# Patient Record
Sex: FEMALE | Race: WHITE | NOT HISPANIC OR LATINO | Employment: OTHER | URBAN - METROPOLITAN AREA
[De-identification: names, ages, dates, MRNs, and addresses within clinical notes are randomized per-mention and may not be internally consistent; named-entity substitution may affect disease eponyms.]

---

## 2018-11-24 ENCOUNTER — OFFICE VISIT (OUTPATIENT)
Dept: URGENT CARE | Facility: CLINIC | Age: 68
End: 2018-11-24
Payer: MEDICARE

## 2018-11-24 VITALS
BODY MASS INDEX: 24.66 KG/M2 | DIASTOLIC BLOOD PRESSURE: 82 MMHG | TEMPERATURE: 98 F | HEART RATE: 71 BPM | HEIGHT: 65 IN | SYSTOLIC BLOOD PRESSURE: 173 MMHG | RESPIRATION RATE: 18 BRPM | OXYGEN SATURATION: 97 % | WEIGHT: 148 LBS

## 2018-11-24 DIAGNOSIS — Z23 NEED FOR PERTUSSIS VACCINATION: ICD-10-CM

## 2018-11-24 DIAGNOSIS — J01.00 ACUTE NON-RECURRENT MAXILLARY SINUSITIS: Primary | ICD-10-CM

## 2018-11-24 DIAGNOSIS — R03.0 ELEVATED BLOOD PRESSURE READING: ICD-10-CM

## 2018-11-24 PROCEDURE — 90471 IMMUNIZATION ADMIN: CPT | Mod: S$GLB,,, | Performed by: FAMILY MEDICINE

## 2018-11-24 PROCEDURE — 90715 TDAP VACCINE 7 YRS/> IM: CPT | Mod: AT,S$GLB,, | Performed by: FAMILY MEDICINE

## 2018-11-24 PROCEDURE — 99213 OFFICE O/P EST LOW 20 MIN: CPT | Mod: 25,S$GLB,, | Performed by: FAMILY MEDICINE

## 2018-11-24 RX ORDER — AMLODIPINE BESYLATE 10 MG/1
10 TABLET ORAL DAILY
Refills: 3 | COMMUNITY
Start: 2018-09-15 | End: 2023-07-25 | Stop reason: SDUPTHER

## 2018-11-24 RX ORDER — OMEPRAZOLE 40 MG/1
40 CAPSULE, DELAYED RELEASE ORAL DAILY
Refills: 11 | COMMUNITY
Start: 2018-10-03 | End: 2023-05-24

## 2018-11-24 RX ORDER — PRAVASTATIN SODIUM 20 MG/1
20 TABLET ORAL DAILY
Refills: 3 | COMMUNITY
Start: 2018-10-24 | End: 2023-07-25 | Stop reason: SDUPTHER

## 2018-11-24 RX ORDER — METOPROLOL SUCCINATE 25 MG/1
25 TABLET, EXTENDED RELEASE ORAL DAILY
Refills: 3 | COMMUNITY
Start: 2018-09-15 | End: 2023-07-13 | Stop reason: SDUPTHER

## 2018-11-24 RX ORDER — CLONIDINE HYDROCHLORIDE 0.1 MG/1
TABLET ORAL
Refills: 0 | COMMUNITY
Start: 2018-11-08 | End: 2023-07-25 | Stop reason: SDUPTHER

## 2018-11-24 RX ORDER — LISINOPRIL AND HYDROCHLOROTHIAZIDE 20; 25 MG/1; MG/1
TABLET ORAL
Refills: 1 | COMMUNITY
Start: 2018-09-19 | End: 2023-07-13 | Stop reason: SDUPTHER

## 2018-11-24 RX ORDER — SERTRALINE HYDROCHLORIDE 50 MG/1
50 TABLET, FILM COATED ORAL DAILY
Refills: 3 | COMMUNITY
Start: 2018-10-02 | End: 2023-07-13

## 2018-11-24 NOTE — PATIENT INSTRUCTIONS
Acute Sinusitis    Acute sinusitis is irritation and swelling of the sinuses. It is usually caused by a viral infection after a common cold. Your doctor can help you find relief.  What is acute sinusitis?  Sinuses are air-filled spaces in the skull behind the face. They are kept moist and clean by a lining of mucosa. Things such as pollen, smoke, and chemical fumes can irritate the mucosa. It can then swell up. As a response to irritation, the mucosa makes more mucus and other fluids. Tiny hairlike cilia cover the mucosa. Cilia help carry mucus toward the opening of the sinus. Too much mucus may cause the cilia to stop working. This blocks the sinus opening. A buildup of fluid in the sinuses then causes pain and pressure. It can also encourage bacteria to grow in the sinuses.  Common symptoms of acute sinusitis  You may have:  · Facial soreness pain  · Headache  · Fever  · Fluid draining in the back of the throat (postnasal drip)  · Congestion  · Drainage that is thick and colored, instead of clear  · Cough  Diagnosing acute sinusitis  Your doctor will ask about your symptoms and health history. He or she will look at your ear, nose, and throat. You usually won't need to have X-rays taken.    The doctor may take a sample of mucus to check for bacteria. If you have sinusitis that keeps coming back, you may need imaging tests such as X-rays or CAT scans. This will help your doctor check for a structural problem that may be causing the infection.  Treating acute sinusitis  Treatment is aimed at unblocking the sinus opening and helping the cilia work again. You may need to take antihistamine and decongestant medicine. These can reduce inflammation and decrease the amount of fluid your sinuses make. If you have a bacterial infection, you will need to take antibiotic medicine for 10 to 14 days. Take this medicine until it is gone, even if you feel better.  Date Last Reviewed: 10/1/2016  © 3821-4814 [employer] [address] [place] [address]. All rights reserved. This information is not intended as a substitute for professional medical care. Always follow your healthcare professional's instructions.

## 2018-11-24 NOTE — PROGRESS NOTES
"Subjective:       Patient ID: Laura Conner is a 68 y.o. female.    Vitals:    11/24/18 1547   BP: (!) 173/82   Pulse: 71   Resp: 18   Temp: 97.5 °F (36.4 °C)   SpO2: 97%   Weight: 67.1 kg (148 lb)   Height: 5' 5" (1.651 m)       Chief Complaint: Cough (started on monday ); Headache; and Sinus Problem  she is using flonase and claritan and OTC cloricedin.  She is worried because she is traveling in 2 days to go to [place] to visit her new born grandchild. She denies CP or SOB,   Sinus Problem   This is a new problem. The current episode started in the past 7 days. The problem has been gradually worsening since onset. There has been no fever. Her pain is at a severity of 3/10. The pain is mild. Associated symptoms include coughing, headaches and sinus pressure. Pertinent negatives include no chills, congestion, ear pain, hoarse voice, shortness of breath or sore throat. Treatments tried: sinus med otc  The treatment provided mild relief.     Review of Systems   Constitution: Negative for chills, fever and malaise/fatigue.   HENT: Positive for sinus pressure. Negative for congestion, ear pain, hoarse voice and sore throat.    Eyes: Negative for discharge and redness.   Cardiovascular: Negative for chest pain, dyspnea on exertion and leg swelling.   Respiratory: Positive for cough. Negative for shortness of breath, sputum production and wheezing.    Musculoskeletal: Negative for myalgias.   Gastrointestinal: Negative for abdominal pain and nausea.   Neurological: Positive for headaches.        Objective:      Physical Exam   Constitutional: She appears well-developed and well-nourished.   HENT:   Head: Normocephalic and atraumatic.   Right Ear: External ear normal.   Left Ear: External ear normal.   Mild erythema, tms clear, nares with clear mucous, bilateral maxillary sinus tenderness,    Eyes: Conjunctivae and EOM are normal. Pupils are equal, round, and reactive to light.   Conjunctiva mildy injected   Neck: " Normal range of motion. Neck supple.   Cardiovascular: Normal rate, regular rhythm and normal heart sounds.   Pulmonary/Chest: Effort normal and breath sounds normal.   Skin: Skin is warm and dry.   Psychiatric: She has a normal mood and affect. Her behavior is normal. Judgment and thought content normal.   Nursing note and vitals reviewed.      Assessment:       1. Acute non-recurrent maxillary sinusitis    2. Need for pertussis vaccination    3. Elevated blood pressure reading        Plan:       Laura was seen today for cough, headache and sinus problem.    Diagnoses and all orders for this visit:    Acute non-recurrent maxillary sinusitis    Need for pertussis vaccination  -     (In Office Administered) Tdap Vaccine    Elevated blood pressure reading    rest fluids, mucinex , flonase

## 2019-09-20 ENCOUNTER — HISTORICAL (OUTPATIENT)
Dept: URGENT CARE | Facility: CLINIC | Age: 69
End: 2019-09-20

## 2019-10-21 ENCOUNTER — HISTORICAL (OUTPATIENT)
Dept: ADMINISTRATIVE | Facility: HOSPITAL | Age: 69
End: 2019-10-21

## 2020-01-20 ENCOUNTER — HISTORICAL (OUTPATIENT)
Dept: ADMINISTRATIVE | Facility: HOSPITAL | Age: 70
End: 2020-01-20

## 2020-04-20 ENCOUNTER — HISTORICAL (OUTPATIENT)
Dept: URGENT CARE | Facility: CLINIC | Age: 70
End: 2020-04-20

## 2020-11-15 ENCOUNTER — OFFICE VISIT (OUTPATIENT)
Dept: URGENT CARE | Facility: CLINIC | Age: 70
End: 2020-11-15
Payer: MEDICARE

## 2020-11-15 VITALS
TEMPERATURE: 98 F | DIASTOLIC BLOOD PRESSURE: 78 MMHG | OXYGEN SATURATION: 97 % | WEIGHT: 145 LBS | HEART RATE: 67 BPM | SYSTOLIC BLOOD PRESSURE: 159 MMHG | HEIGHT: 65 IN | RESPIRATION RATE: 17 BRPM | BODY MASS INDEX: 24.16 KG/M2

## 2020-11-15 DIAGNOSIS — N30.01 ACUTE CYSTITIS WITH HEMATURIA: Primary | ICD-10-CM

## 2020-11-15 LAB
BILIRUB UR QL STRIP: NEGATIVE
GLUCOSE UR QL STRIP: NEGATIVE
KETONES UR QL STRIP: NEGATIVE
LEUKOCYTE ESTERASE UR QL STRIP: POSITIVE
PH, POC UA: 5.5 (ref 5–8)
POC BLOOD, URINE: POSITIVE
POC NITRATES, URINE: NEGATIVE
PROT UR QL STRIP: POSITIVE
SP GR UR STRIP: 1.01 (ref 1–1.03)
UROBILINOGEN UR STRIP-ACNC: NORMAL (ref 0.1–1.1)

## 2020-11-15 PROCEDURE — 81003 URINALYSIS AUTO W/O SCOPE: CPT | Mod: QW,S$GLB,, | Performed by: FAMILY MEDICINE

## 2020-11-15 PROCEDURE — 81003 POCT URINALYSIS, DIPSTICK, AUTOMATED, W/O SCOPE: ICD-10-PCS | Mod: QW,S$GLB,, | Performed by: FAMILY MEDICINE

## 2020-11-15 PROCEDURE — 99214 OFFICE O/P EST MOD 30 MIN: CPT | Mod: 25,S$GLB,, | Performed by: FAMILY MEDICINE

## 2020-11-15 PROCEDURE — 99214 PR OFFICE/OUTPT VISIT, EST, LEVL IV, 30-39 MIN: ICD-10-PCS | Mod: 25,S$GLB,, | Performed by: FAMILY MEDICINE

## 2020-11-15 RX ORDER — LORATADINE 10 MG/1
10 TABLET ORAL DAILY
COMMUNITY
Start: 2020-09-09

## 2020-11-15 RX ORDER — SULFAMETHOXAZOLE AND TRIMETHOPRIM 800; 160 MG/1; MG/1
1 TABLET ORAL 2 TIMES DAILY
Qty: 14 TABLET | Refills: 0 | Status: SHIPPED | OUTPATIENT
Start: 2020-11-15 | End: 2023-05-24

## 2020-11-15 RX ORDER — ESOMEPRAZOLE MAGNESIUM 40 MG/1
40 CAPSULE, DELAYED RELEASE ORAL DAILY
COMMUNITY
Start: 2020-09-03 | End: 2023-07-13 | Stop reason: SDUPTHER

## 2020-11-15 RX ORDER — OXYBUTYNIN CHLORIDE 5 MG/1
5 TABLET, EXTENDED RELEASE ORAL DAILY
COMMUNITY
Start: 2020-09-09 | End: 2023-07-25 | Stop reason: SDUPTHER

## 2020-11-15 NOTE — PATIENT INSTRUCTIONS
PLEASE READ YOUR DISCHARGE INSTRUCTIONS ENTIRELY AS IT CONTAINS IMPORTANT INFORMATION.      Take the antibiotics to completion.     Drink plenty of fluids, wipe front to back, take showers not baths, no scented soaps, wear breathable cotton underwear, urinate after sexual intercourse.     Please take your antibiotics to completion. Take a probiotic or eat yogurt daily with the medication. Take with a meal. Stop the medication and call the clinic or your primary care provider if you develop severe watery diarrhea, severe abdominal pain, blood in your stool.       Please go to the ER for worsening symptoms including fever, worsening flank pain, vomiting, etc.       Please return or see your primary care doctor if you develop new or worsening symptoms.     Please arrange follow up with your primary medical clinic as soon as possible. You must understand that you've received an Urgent Care treatment only and that you may be released before all of your medical problems are known or treated. You, the patient, will arrange for follow up as instructed. If your symptoms worsen or fail to improve you should go to the Emergency Room.  WE CANNOT RULE OUT ALL POSSIBLE CAUSES OF YOUR SYMPTOMS IN THE URGENT CARE SETTING PLEASE GO TO THE ER IF YOU FEELS YOUR CONDITION IS WORSENING OR YOU WOULD LIKE EMERGENT EVALUATION.      Understanding Urinary Tract Infections (UTIs)  Most UTIs are caused by bacteria, although they may also be caused by viruses or fungi. Bacteria from the bowel are the most common source of infection. The infection may start because of any of the following:  · Sexual activity. During sex, bacteria can travel from the penis, vagina, or rectum into the urethra.   · Bacteria on the skin outside the rectum may travel into the urethra. This is more common in women since the rectum and urethra are closer to each other than in men. Wiping from front to back after using the toilet and keeping the area clean can help  prevent germs from getting to the urethra.  · Blockage of urine flow through the urinary tract. If urine sits too long, germs may start to grow out of control.      Parts of the urinary tract  The infection can occur in any part of the urinary tract.  · The kidneys collect and store urine.  · The ureters carry urine from the kidneys to the bladder.  · The bladder holds urine until you are ready to let it out.  · The urethra carries urine from the bladder out of the body. It is shorter in women, so bacteria can move through it more easily. The urethra is longer in men, so a UTI is less likely to reach the bladder or kidneys in men.  Date Last Reviewed: 1/1/2017  © 6651-6539 [employer]. [address] [place] [address], [place] [address]. All rights reserved. This information is not intended as a substitute for professional medical care. Always follow your healthcare professional's instructions.

## 2020-11-15 NOTE — PROGRESS NOTES
"Subjective:       Patient ID: Laura Conner is a 70 y.o. female.    Vitals:  height is 5' 5" (1.651 m) and weight is 65.8 kg (145 lb). Her tympanic temperature is 98.1 °F (36.7 °C). Her blood pressure is 159/78 (abnormal) and her pulse is 67. Her respiration is 17 and oxygen saturation is 97%.     Chief Complaint: Dysuria    Pt states urinary urgency, frequency, and burning x 6 days. Pt states hematuria today. No fever, flank pain, vaginal discharge or itching. Hx of utis feels similar.    Dysuria   This is a new problem. The current episode started in the past 7 days. The problem has been gradually worsening. The pain is at a severity of 5/10. There has been no fever. Associated symptoms include frequency, hematuria and urgency. Pertinent negatives include no chills, nausea, vomiting or rash. She has tried nothing for the symptoms. Her past medical history is significant for recurrent UTIs.       Constitution: Negative for chills, fatigue and fever.   HENT: Negative for congestion and sore throat.    Neck: Negative for painful lymph nodes.   Cardiovascular: Negative for chest pain and leg swelling.   Eyes: Negative for double vision and blurred vision.   Respiratory: Negative for cough and shortness of breath.    Gastrointestinal: Negative for nausea, vomiting and diarrhea.   Genitourinary: Positive for dysuria, frequency, urgency and hematuria. Negative for history of kidney stones.   Musculoskeletal: Negative for joint pain, joint swelling, muscle cramps and muscle ache.   Skin: Negative for color change, pale, rash and bruising.   Allergic/Immunologic: Negative for seasonal allergies.   Neurological: Negative for dizziness, history of vertigo, light-headedness, passing out and headaches.   Hematologic/Lymphatic: Negative for swollen lymph nodes.   Psychiatric/Behavioral: Negative for nervous/anxious, sleep disturbance and depression. The patient is not nervous/anxious.        Objective:      Physical Exam "   Constitutional: She is oriented to person, place, and time. She appears well-developed.   HENT:   Head: Normocephalic and atraumatic.   Ears:   Right Ear: External ear normal.   Left Ear: External ear normal.   Nose: Nose normal. No nasal deformity. No epistaxis.   Mouth/Throat: Oropharynx is clear and moist and mucous membranes are normal.   Eyes: Lids are normal.   Neck: Trachea normal, normal range of motion and phonation normal. Neck supple.   Cardiovascular: Normal pulses.   Pulmonary/Chest: Effort normal.   Abdominal: Soft. Normal appearance and bowel sounds are normal. She exhibits no distension. There is no abdominal tenderness. There is no rebound, no guarding, no tenderness at McBurney's point, negative Pugh's sign, no left CVA tenderness and no right CVA tenderness.   Neurological: She is alert and oriented to person, place, and time.   Skin: Skin is warm, dry and intact. Psychiatric: Her speech is normal and behavior is normal.   Nursing note and vitals reviewed.        Results for orders placed or performed in visit on 11/15/20   POCT Urinalysis, Dipstick, Automated, W/O Scope   Result Value Ref Range    POC Blood, Urine Positive (A) Negative    POC Bilirubin, Urine Negative Negative    POC Urobilinogen, Urine Normal 0.1 - 1.1    POC Ketones, Urine Negative Negative    POC Protein, Urine Positive (A) Negative    POC Nitrates, Urine Negative Negative    POC Glucose, Urine Negative Negative    pH, UA 5.5 5 - 8    POC Specific Gravity, Urine 1.015 1.003 - 1.029    POC Leukocytes, Urine Positive (A) Negative       Assessment:       1. Acute cystitis with hematuria        Plan:         Acute cystitis with hematuria  -     POCT Urinalysis, Dipstick, Automated, W/O Scope  -     sulfamethoxazole-trimethoprim 800-160mg (BACTRIM DS) 800-160 mg Tab; Take 1 tablet by mouth 2 (two) times daily.  Dispense: 14 tablet; Refill: 0       declined pyridium  Patient Instructions     PLEASE READ YOUR DISCHARGE  INSTRUCTIONS ENTIRELY AS IT CONTAINS IMPORTANT INFORMATION.      Take the antibiotics to completion.     Drink plenty of fluids, wipe front to back, take showers not baths, no scented soaps, wear breathable cotton underwear, urinate after sexual intercourse.     Please take your antibiotics to completion. Take a probiotic or eat yogurt daily with the medication. Take with a meal. Stop the medication and call the clinic or your primary care provider if you develop severe watery diarrhea, severe abdominal pain, blood in your stool.       Please go to the ER for worsening symptoms including fever, worsening flank pain, vomiting, etc.       Please return or see your primary care doctor if you develop new or worsening symptoms.     Please arrange follow up with your primary medical clinic as soon as possible. You must understand that you've received an Urgent Care treatment only and that you may be released before all of your medical problems are known or treated. You, the patient, will arrange for follow up as instructed. If your symptoms worsen or fail to improve you should go to the Emergency Room.  WE CANNOT RULE OUT ALL POSSIBLE CAUSES OF YOUR SYMPTOMS IN THE URGENT CARE SETTING PLEASE GO TO THE ER IF YOU FEELS YOUR CONDITION IS WORSENING OR YOU WOULD LIKE EMERGENT EVALUATION.      Understanding Urinary Tract Infections (UTIs)  Most UTIs are caused by bacteria, although they may also be caused by viruses or fungi. Bacteria from the bowel are the most common source of infection. The infection may start because of any of the following:  · Sexual activity. During sex, bacteria can travel from the penis, vagina, or rectum into the urethra.   · Bacteria on the skin outside the rectum may travel into the urethra. This is more common in women since the rectum and urethra are closer to each other than in men. Wiping from front to back after using the toilet and keeping the area clean can help prevent germs from getting to  the urethra.  · Blockage of urine flow through the urinary tract. If urine sits too long, germs may start to grow out of control.      Parts of the urinary tract  The infection can occur in any part of the urinary tract.  · The kidneys collect and store urine.  · The ureters carry urine from the kidneys to the bladder.  · The bladder holds urine until you are ready to let it out.  · The urethra carries urine from the bladder out of the body. It is shorter in women, so bacteria can move through it more easily. The urethra is longer in men, so a UTI is less likely to reach the bladder or kidneys in men.  Date Last Reviewed: 1/1/2017  © 6486-2043 [employer]. [address] [place] [address], [place] [address]. All rights reserved. This information is not intended as a substitute for professional medical care. Always follow your healthcare professional's instructions.

## 2021-02-01 ENCOUNTER — HISTORICAL (OUTPATIENT)
Dept: URGENT CARE | Facility: CLINIC | Age: 71
End: 2021-02-01

## 2022-04-07 ENCOUNTER — HISTORICAL (OUTPATIENT)
Dept: ADMINISTRATIVE | Facility: HOSPITAL | Age: 72
End: 2022-04-07
Payer: MEDICARE

## 2022-04-23 VITALS
WEIGHT: 145 LBS | OXYGEN SATURATION: 96 % | SYSTOLIC BLOOD PRESSURE: 155 MMHG | DIASTOLIC BLOOD PRESSURE: 76 MMHG | HEIGHT: 65 IN | BODY MASS INDEX: 24.16 KG/M2

## 2022-04-28 NOTE — OP NOTE
Patient:   Laura Conner             MRN: [id number]            FIN: [id number]               Age:   69 years     Sex:  Female     :  1950   Associated Diagnoses:   None   Author:   Maynor MURCIA, Tammy SMITH      NAME: Laura Conner  SURGEON:  Tammy Mcguire MD    YOB: 1950  DATE OF SURGERY: 2020    PREOPERATIVE DIAGNOSIS:  Cataract, left eye.    POSTOPERATIVE DIAGNOSIS:  Cataract, left eye.    PROCEDURE:  Cataract extraction with intraocular lens placement, left eye.    HISTORY:  The patient is a 69 year-old female, who presented to the clinic with a 2+ nuclear sclerotic cataract causing difficulty in patient's activities of daily living. After thorough discussion of risks, benefits, alternatives and complications, the patient expressed understanding and wished to proceed with cataract extraction.  Goal of distance verified at bedside.  Pt has decided to proceed with femto assisted surgery, and distance monofocal IOL with LRI.    PROCEDURE IN DETAIL:  On the day of surgery patient was brought to the preoperative holding area, where patient was given five drops each of phenylephrine, tropicamide and Cyclopentolate into the operative eye.   Patient was then brought to the [facility], where LRI, Capsulotomy  and lens fragmentation were completed after toric markings were made.  Patient was then brought to the operating room where patient was given Marcaine drops into the operative eye.  Patient was prepped and draped in normal sterile fashion.  A lid speculum was inserted.  Using operating microscope, 1.0-mm keratome was used to create a side port wound through which 1% epishugarcaine was injected, followed by Viscoat.  A 2.65 mm keratome was used to create triplanar phacoemulsification wound, through which continuous tear capsulorrhexis was performed using cystotome and Utrata forceps. Hydrodissection and delineation were performed until lens was rotating freely in capsular bag.   Phacoemulsification was carried out in divide and conquer fashion to remove nuclear and epinuclear fragments.  I/A was used to remove cortex carefully.  Healon was injected into the capsular bag, which was found to be free of tears or defect.  A ZCBOO +19.0 diopter lens was inserted and carefully rotated into place a.  Viscoelastic was removed from the eye.  Wounds were sealed using hydration.  Lens was in excellent position at end of case.  Vigamox  and Maxitrol ointment was placed into the patient's eye, which was patched and shielded.  The patient tolerated the procedure well and will followup tomorrow in clinic.

## 2022-04-28 NOTE — OP NOTE
Patient:   Laura Conner             MRN: [id number]            FIN: [id number]               Age:   69 years     Sex:  Female     :  1950   Associated Diagnoses:   None   Author:   Maynor MURCIA, Tammy SMITH      NAME: Laura Conner  SURGEON:  Tammy Mcguire MD    YOB: 1950  DATE OF SURGERY:10/21/2019    PREOPERATIVE DIAGNOSIS:  Cataract, right eye.    POSTOPERATIVE DIAGNOSIS:  Cataract, right eye.    PROCEDURE:  Cataract extraction with intraocular lens placement, right eye.    HISTORY:  The patient is a 69 year-old female, who presented to the clinic with a 2+ nuclear sclerotic cataract causing difficulty in patient's activities of daily living. After thorough discussion of risks, benefits, alternatives and complications, the patient expressed understanding and wished to proceed with cataract extraction, monofocal IOL, and Catalys laser.  Goal of distance verified at bedside.      PROCEDURE IN DETAIL:  On the day of surgery patient was brought to the preoperative holding area, where patient was given five drops each of phenylephrine, tropicamide and Cyclopentolate into the operative eye.   Patient was then brought to the laser suite, where laser was used to perform capsulotomy and fragment the lens.  Patient was then brought to the operating room where patient was given Marcaine drops into the operative eye.  Patient was prepped and draped in normal sterile fashion.  A lid speculum was inserted.  Using operating microscope, 1.0-mm keratome was used to create a side port wound through which 1% epishugarcaine was injected, followed by Viscoat.  A 2.65 mm keratome was used to create triplanar phacoemulsification wound, through which capsulotomy was carefully removed using Utrata forceps. Hydrodissection and delineation were performed until lens was rotating freely in capsular bag.  Phacoemulsification was carried out in divide and conquer fashion to remove nuclear and epinuclear fragments.   I/A was used to remove cortex carefully.  Healon was injected into the capsular bag, which was found to be free of tears or defect.  A ZCBOO +20.5 diopter lens was inserted and carefully rotated into place.  Viscoelastic was removed from the eye.  Wounds were sealed using hydration.  Lens was in excellent position at end of case.  Vigamox  and Maxitrol ointment was placed into the patient's eye, which was patched and shielded.  The patient tolerated the procedure well and will followup tomorrow in clinic.

## 2023-05-24 ENCOUNTER — OFFICE VISIT (OUTPATIENT)
Dept: INTERNAL MEDICINE | Facility: CLINIC | Age: 73
End: 2023-05-24
Payer: MEDICARE

## 2023-05-24 ENCOUNTER — CLINICAL SUPPORT (OUTPATIENT)
Dept: INTERNAL MEDICINE | Facility: CLINIC | Age: 73
End: 2023-05-24
Payer: MEDICARE

## 2023-05-24 ENCOUNTER — HOSPITAL ENCOUNTER (OUTPATIENT)
Dept: RADIOLOGY | Facility: HOSPITAL | Age: 73
Discharge: HOME | End: 2023-05-24
Attending: INTERNAL MEDICINE
Payer: MEDICARE

## 2023-05-24 VITALS
SYSTOLIC BLOOD PRESSURE: 136 MMHG | DIASTOLIC BLOOD PRESSURE: 70 MMHG | TEMPERATURE: 98 F | BODY MASS INDEX: 24.87 KG/M2 | OXYGEN SATURATION: 98 % | HEART RATE: 67 BPM | WEIGHT: 149.25 LBS

## 2023-05-24 VITALS — HEIGHT: 65 IN | BODY MASS INDEX: 24.66 KG/M2 | WEIGHT: 148 LBS

## 2023-05-24 DIAGNOSIS — N95.1 MENOPAUSAL STATE: ICD-10-CM

## 2023-05-24 DIAGNOSIS — Z78.0 ASYMPTOMATIC MENOPAUSE: ICD-10-CM

## 2023-05-24 DIAGNOSIS — E78.5 HYPERLIPIDEMIA LDL GOAL <100: ICD-10-CM

## 2023-05-24 DIAGNOSIS — Z12.31 ENCOUNTER FOR SCREENING MAMMOGRAM FOR MALIGNANT NEOPLASM OF BREAST: ICD-10-CM

## 2023-05-24 DIAGNOSIS — R53.83 FATIGUE, UNSPECIFIED TYPE: ICD-10-CM

## 2023-05-24 DIAGNOSIS — Z87.440 HISTORY OF RECURRENT UTIS: ICD-10-CM

## 2023-05-24 DIAGNOSIS — I10 PRIMARY HYPERTENSION: Primary | Chronic | ICD-10-CM

## 2023-05-24 DIAGNOSIS — R63.5 WEIGHT GAIN: ICD-10-CM

## 2023-05-24 DIAGNOSIS — Z00.00 ANNUAL PHYSICAL EXAM: Primary | ICD-10-CM

## 2023-05-24 DIAGNOSIS — K21.9 GASTROESOPHAGEAL REFLUX DISEASE, UNSPECIFIED WHETHER ESOPHAGITIS PRESENT: Chronic | ICD-10-CM

## 2023-05-24 DIAGNOSIS — R79.9 ABNORMAL FINDING OF BLOOD CHEMISTRY, UNSPECIFIED: ICD-10-CM

## 2023-05-24 DIAGNOSIS — N39.41 URGE INCONTINENCE: ICD-10-CM

## 2023-05-24 LAB
ALBUMIN SERPL BCP-MCNC: 4.5 G/DL (ref 3.5–5.2)
ALP SERPL-CCNC: 61 U/L (ref 55–135)
ALT SERPL W/O P-5'-P-CCNC: 17 U/L (ref 10–44)
ANION GAP SERPL CALC-SCNC: 9 MMOL/L (ref 8–16)
AST SERPL-CCNC: 22 U/L (ref 10–40)
BASOPHILS # BLD AUTO: 0.02 K/UL (ref 0–0.2)
BASOPHILS NFR BLD: 0.4 % (ref 0–1.9)
BILIRUB SERPL-MCNC: 0.6 MG/DL (ref 0.1–1)
BUN SERPL-MCNC: 16 MG/DL (ref 8–23)
CALCIUM SERPL-MCNC: 10.3 MG/DL (ref 8.7–10.5)
CHLORIDE SERPL-SCNC: 105 MMOL/L (ref 95–110)
CHOLEST SERPL-MCNC: 222 MG/DL (ref 120–199)
CHOLEST/HDLC SERPL: 2.7 {RATIO} (ref 2–5)
CO2 SERPL-SCNC: 27 MMOL/L (ref 23–29)
CREAT SERPL-MCNC: 0.9 MG/DL (ref 0.5–1.4)
DIFFERENTIAL METHOD: ABNORMAL
EOSINOPHIL # BLD AUTO: 0.1 K/UL (ref 0–0.5)
EOSINOPHIL NFR BLD: 1.4 % (ref 0–8)
ERYTHROCYTE [DISTWIDTH] IN BLOOD BY AUTOMATED COUNT: 13.4 % (ref 11.5–14.5)
EST. GFR  (NO RACE VARIABLE): >60 ML/MIN/1.73 M^2
ESTIMATED AVG GLUCOSE: 94 MG/DL (ref 68–131)
ESTRADIOL SERPL-MCNC: 13 PG/ML
FSH SERPL-ACNC: 68.28 MIU/ML
GLUCOSE SERPL-MCNC: 88 MG/DL (ref 70–110)
HBA1C MFR BLD: 4.9 % (ref 4–5.6)
HCT VFR BLD AUTO: 39.5 % (ref 37–48.5)
HDLC SERPL-MCNC: 82 MG/DL (ref 40–75)
HDLC SERPL: 36.9 % (ref 20–50)
HGB BLD-MCNC: 13.2 G/DL (ref 12–16)
HYALINE CASTS UR QL AUTO: 3 /LPF
IMM GRANULOCYTES # BLD AUTO: 0.05 K/UL (ref 0–0.04)
IMM GRANULOCYTES NFR BLD AUTO: 0.9 % (ref 0–0.5)
LDLC SERPL CALC-MCNC: 126.4 MG/DL (ref 63–159)
LYMPHOCYTES # BLD AUTO: 1.6 K/UL (ref 1–4.8)
LYMPHOCYTES NFR BLD: 28.6 % (ref 18–48)
MCH RBC QN AUTO: 31.1 PG (ref 27–31)
MCHC RBC AUTO-ENTMCNC: 33.4 G/DL (ref 32–36)
MCV RBC AUTO: 93 FL (ref 82–98)
MICROSCOPIC COMMENT: ABNORMAL
MONOCYTES # BLD AUTO: 0.5 K/UL (ref 0.3–1)
MONOCYTES NFR BLD: 9.5 % (ref 4–15)
NEUTROPHILS # BLD AUTO: 3.3 K/UL (ref 1.8–7.7)
NEUTROPHILS NFR BLD: 59.2 % (ref 38–73)
NONHDLC SERPL-MCNC: 140 MG/DL
NRBC BLD-RTO: 0 /100 WBC
PLATELET # BLD AUTO: 238 K/UL (ref 150–450)
PMV BLD AUTO: 9.8 FL (ref 9.2–12.9)
POTASSIUM SERPL-SCNC: 4 MMOL/L (ref 3.5–5.1)
PROT SERPL-MCNC: 7.5 G/DL (ref 6–8.4)
RBC # BLD AUTO: 4.24 M/UL (ref 4–5.4)
RBC #/AREA URNS AUTO: 1 /HPF (ref 0–4)
SODIUM SERPL-SCNC: 141 MMOL/L (ref 136–145)
T4 FREE SERPL-MCNC: 0.83 NG/DL (ref 0.71–1.51)
THYROPEROXIDASE IGG SERPL-ACNC: <6 IU/ML
TRIGL SERPL-MCNC: 68 MG/DL (ref 30–150)
TSH SERPL DL<=0.005 MIU/L-ACNC: 2.16 UIU/ML (ref 0.4–4)
WBC # BLD AUTO: 5.55 K/UL (ref 3.9–12.7)
WBC #/AREA URNS AUTO: 3 /HPF (ref 0–5)

## 2023-05-24 PROCEDURE — 84403 ASSAY OF TOTAL TESTOSTERONE: CPT | Performed by: INTERNAL MEDICINE

## 2023-05-24 PROCEDURE — 99999 PR PBB SHADOW E&M-EST. PATIENT-LVL IV: CPT | Mod: PBBFAC,,, | Performed by: INTERNAL MEDICINE

## 2023-05-24 PROCEDURE — 77063 BREAST TOMOSYNTHESIS BI: CPT | Mod: 26,,, | Performed by: RADIOLOGY

## 2023-05-24 PROCEDURE — 99214 OFFICE O/P EST MOD 30 MIN: CPT | Mod: PBBFAC | Performed by: INTERNAL MEDICINE

## 2023-05-24 PROCEDURE — 77067 SCR MAMMO BI INCL CAD: CPT | Mod: TC

## 2023-05-24 PROCEDURE — 81001 URINALYSIS AUTO W/SCOPE: CPT | Performed by: INTERNAL MEDICINE

## 2023-05-24 PROCEDURE — 84270 ASSAY OF SEX HORMONE GLOBUL: CPT | Performed by: INTERNAL MEDICINE

## 2023-05-24 PROCEDURE — 83036 HEMOGLOBIN GLYCOSYLATED A1C: CPT | Performed by: INTERNAL MEDICINE

## 2023-05-24 PROCEDURE — 84443 ASSAY THYROID STIM HORMONE: CPT | Performed by: INTERNAL MEDICINE

## 2023-05-24 PROCEDURE — 77067 MAMMO DIGITAL SCREENING BILAT WITH TOMO: ICD-10-PCS | Mod: 26,,, | Performed by: RADIOLOGY

## 2023-05-24 PROCEDURE — 85025 COMPLETE CBC W/AUTO DIFF WBC: CPT | Performed by: INTERNAL MEDICINE

## 2023-05-24 PROCEDURE — 83001 ASSAY OF GONADOTROPIN (FSH): CPT | Performed by: INTERNAL MEDICINE

## 2023-05-24 PROCEDURE — 77067 SCR MAMMO BI INCL CAD: CPT | Mod: 26,,, | Performed by: RADIOLOGY

## 2023-05-24 PROCEDURE — 99204 OFFICE O/P NEW MOD 45 MIN: CPT | Mod: S$PBB,,, | Performed by: INTERNAL MEDICINE

## 2023-05-24 PROCEDURE — 82670 ASSAY OF TOTAL ESTRADIOL: CPT | Performed by: INTERNAL MEDICINE

## 2023-05-24 PROCEDURE — 80053 COMPREHEN METABOLIC PANEL: CPT | Performed by: INTERNAL MEDICINE

## 2023-05-24 PROCEDURE — 80061 LIPID PANEL: CPT | Performed by: INTERNAL MEDICINE

## 2023-05-24 PROCEDURE — 77063 MAMMO DIGITAL SCREENING BILAT WITH TOMO: ICD-10-PCS | Mod: 26,,, | Performed by: RADIOLOGY

## 2023-05-24 PROCEDURE — 84439 ASSAY OF FREE THYROXINE: CPT | Performed by: INTERNAL MEDICINE

## 2023-05-24 PROCEDURE — 36415 COLL VENOUS BLD VENIPUNCTURE: CPT | Performed by: INTERNAL MEDICINE

## 2023-05-24 PROCEDURE — 99999 PR PBB SHADOW E&M-EST. PATIENT-LVL IV: ICD-10-PCS | Mod: PBBFAC,,, | Performed by: INTERNAL MEDICINE

## 2023-05-24 PROCEDURE — 99204 PR OFFICE/OUTPT VISIT, NEW, LEVL IV, 45-59 MIN: ICD-10-PCS | Mod: S$PBB,,, | Performed by: INTERNAL MEDICINE

## 2023-05-24 RX ORDER — DOCUSATE CALCIUM 240 MG
240 CAPSULE ORAL 2 TIMES DAILY
COMMUNITY

## 2023-05-24 RX ORDER — NALTREXONE HYDROCHLORIDE AND BUPROPION HYDROCHLORIDE 8; 90 MG/1; MG/1
TABLET, EXTENDED RELEASE ORAL
COMMUNITY
Start: 2023-04-26

## 2023-05-24 RX ORDER — ASCORBIC ACID 500 MG
500 TABLET ORAL DAILY
COMMUNITY

## 2023-05-24 RX ORDER — ASPIRIN 325 MG
100 TABLET, DELAYED RELEASE (ENTERIC COATED) ORAL DAILY
COMMUNITY

## 2023-05-28 ENCOUNTER — PATIENT MESSAGE (OUTPATIENT)
Dept: ADMINISTRATIVE | Facility: HOSPITAL | Age: 73
End: 2023-05-28
Payer: MEDICARE

## 2023-06-03 LAB
ALBUMIN SERPL-MCNC: 4.7 G/DL (ref 3.6–5.1)
SHBG SERPL-SCNC: 52 NMOL/L (ref 14–73)
TESTOST FREE SERPL-MCNC: 3.5 PG/ML (ref 0.3–5)
TESTOST SERPL-MCNC: 43 NG/DL (ref 2–45)
TESTOSTERONE.FREE+WB SERPL-MCNC: 7.4 NG/DL (ref 0.5–8.8)

## 2023-06-04 RX ORDER — BROMPHENIRAMINE MALEATE, DEXTROMETHORPHAN HBR, PHENYLEPHRINE HCL, DIPHENHYDRAMINE HCL, PHENYLEPHRINE HCL 0.52G
1.04 KIT ORAL 2 TIMES DAILY
COMMUNITY

## 2023-06-04 NOTE — PROGRESS NOTES
Subjective:       Patient ID: Laura Conner is a 73 y.o. female who presents today for:    Chief Complaint:   Chief Complaint   Patient presents with    Establish [facility]       HPI:  Very pleasant 73-year-old nonsmoking female here to establish with [facility].  She had a [facility] primary care doctor in [place] she has moved now permanently to the [place] area.  She is a past medical history significant for essential hypertension and dyslipidemia as well chronic constipation with a tortuous colon and all these medical issues are now under good control on her medication regimen.  She has no new complaints today.  She did just start Contrave a few weeks ago with hope of losing weight.  She has a history of urge incontinence and is on low-dose Ditropan and her last urinary tract infection was a year ago.  She went through menopause around 49 years of age and was not on hormone replacement therapy.    Review of Systems   Constitutional:  Negative for chills, fever and weight loss.   HENT:  Negative for sore throat.    Eyes:  Negative for blurred vision and double vision.   Respiratory:  Negative for cough and shortness of breath.    Cardiovascular:  Negative for chest pain and palpitations.   Gastrointestinal:  Negative for constipation, diarrhea, nausea and vomiting.   Genitourinary:  Negative for dysuria and hematuria.   Musculoskeletal:  Negative for joint pain and myalgias.   Skin:  Negative for itching and rash.   Neurological:  Negative for sensory change, focal weakness and headaches.   Endo/Heme/Allergies:  Does not bruise/bleed easily.   Psychiatric/Behavioral:  Negative for depression and suicidal ideas.       Medications:  Outpatient Encounter Medications as of 5/24/2023   Medication Sig Dispense Refill    amLODIPine (NORVASC) 10 MG tablet Take 10 mg by mouth once daily.  3    ascorbic acid, vitamin C, (VITAMIN C) 500 MG tablet Take 500 mg by mouth once daily.       cloNIDine (CATAPRES) 0.1 MG tablet TAKE 1 TABLET ONCE A DAY AT BEDTIME ORALLY  0    co-enzyme Q-10 50 mg capsule Take 100 mg by mouth once daily.      CONTRAVE 8-90 mg TbSR Take by mouth.      CRANBERRY CONC-ASCORBIC ACID ORAL Take by mouth.      docusate calcium (SURFAK) 240 mg capsule Take 240 mg by mouth 2 (two) times daily.      esomeprazole (NEXIUM) 40 MG capsule Take 40 mg by mouth once daily.      lisinopril-hydrochlorothiazide (PRINZIDE,ZESTORETIC) 20-25 mg Tab TAKE 1 TABLET ONCE A DAY ORALLY 90 DAYS  1    loratadine (CLARITIN) 10 mg tablet Take 10 mg by mouth once daily.      metoprolol succinate (TOPROL-XL) 25 MG 24 hr tablet Take 25 mg by mouth once daily.  3    oxybutynin (DITROPAN-XL) 5 MG TR24 Take 5 mg by mouth once daily.      pravastatin (PRAVACHOL) 20 MG tablet Take 20 mg by mouth once daily.  3    psyllium 0.52 gram capsule Take 1.04 g by mouth 2 (two) times a day.      sertraline (ZOLOFT) 50 MG tablet Take 50 mg by mouth once daily.  3    UNABLE TO FIND Pre-Pro biotic      UNABLE TO FIND ADK      UNABLE TO FIND prevagen      [DISCONTINUED] psyllium (METAMUCIL) powder Take 1 packet by mouth once daily.      [DISCONTINUED] omeprazole (PRILOSEC) 40 MG capsule Take 40 mg by mouth once daily.  11    [DISCONTINUED] sulfamethoxazole-trimethoprim 800-160mg (BACTRIM DS) 800-160 mg Tab Take 1 tablet by mouth 2 (two) times daily. 14 tablet 0     No facility-administered encounter medications on file as of 5/24/2023.       Allergies:  Review of patient's allergies indicates:  No Known Allergies    Health Maintenance:  Immunization History   Administered Date(s) Administered    COVID-19, MRNA, LN-S, PF (Pfizer) (Purple Cap) 01/08/2021, 01/29/2021, 09/29/2021    COVID-19, mRNA, LNP-S, bivalent booster, PF (PFIZER OMICRON) 09/28/2022    Influenza (FLUAD) - Quadrivalent - Adjuvanted - PF *Preferred* (65+) 09/28/2022    Influenza - High Dose - PF (65 years and older) 10/27/2014, 11/09/2016, 10/12/2017, 11/08/2018     Influenza - Quadrivalent - PF *Preferred* (6 months and older) 10/07/2013, 09/18/2019    Pneumococcal Polysaccharide - 23 Valent 09/20/2019    Tdap 11/24/2018    Zoster Recombinant 12/01/2021, 04/28/2022      Health Maintenance   Topic Date Due    Hepatitis C Screening  Never done    DEXA Scan  Never done    Mammogram  04/26/2023    Lipid Panel  05/24/2028    TETANUS VACCINE  11/24/2028          Objective:      Vital Signs  Temp: 98.4 °F (36.9 °C)  Pulse: 67  SpO2: 98 %  BP: 136/70  Pain Score: 0-No pain  Height and Weight  Weight: 67.7 kg (149 lb 4 oz)]    Physical Exam  HENT:      Head: Normocephalic and atraumatic.   Eyes:      General: No scleral icterus.  Cardiovascular:      Rate and Rhythm: Normal rate and regular rhythm.      Heart sounds: No murmur heard.  Pulmonary:      Effort: Pulmonary effort is normal.      Breath sounds: Normal breath sounds. No rales.   Abdominal:      General: Bowel sounds are normal.      Palpations: Abdomen is soft.      Tenderness: There is no abdominal tenderness.   Musculoskeletal:         General: No tenderness.      Cervical back: Neck supple.   Neurological:      Mental Status: She is alert and oriented to person, place, and time.   Psychiatric:         Mood and Affect: Mood normal.         Behavior: Behavior normal.         Thought Content: Thought content normal.         Judgment: Judgment normal.      Lab Results   Component Value Date    WBC 5.55 05/24/2023    HGB 13.2 05/24/2023    HCT 39.5 05/24/2023     05/24/2023    CHOL 222 (H) 05/24/2023    TRIG 68 05/24/2023    HDL 82 (H) 05/24/2023    ALT 17 05/24/2023    AST 22 05/24/2023     05/24/2023    K 4.0 05/24/2023     05/24/2023    CREATININE 0.9 05/24/2023    BUN 16 05/24/2023    CO2 27 05/24/2023    TSH 2.159 05/24/2023    HGBA1C 4.9 05/24/2023     Assessment/plan:     Laura Conner is a 73 y.o.female with:    Primary hypertension  Comments:  continue with amlodipine 10 mg plus lisinopril  20/25 mg daily     Asymptomatic menopause  -     DXA Bone Density Axial Skeleton 1 or more sites; Future    Gastroesophageal reflux disease, unspecified whether esophagitis present  Comments:  ok to continue PPI as long as you keep up with your vitamin D and calcium requirements     Hyperlipidemia LDL goal <100  Comments:  would like to increase pravastatin to 40 mg     Encounter for screening mammogram for malignant neoplasm of breast  -     Mammo Digital Screening Bilat w/ Jude; Future; Expected date: 05/24/2023    Urge incontinence  Comments:  on oxybutynin 5 mg XL daily     History of recurrent UTIs  Comments:  not in over a year- clear today   keep bowel movements daily as you are doing     HEALTH MAINTENANCE     LAST COLONOSCOPY WAS APPROXIMATELY 8 YEARS AGO IN 2015 AND SHE HAD 1 PRIOR TO THAT.  SHE DOES 1 POLYPS.  SHE IS A VERY TORTUOUS COLON.  SHE WOULD LIKE TO AVOID ANOTHER COLONOSCOPY.    LAST MAMMOGRAM IS IN APRIL OF 2022 AND WE WILL ORDER 1 TODAY.    LAST BONE MINERAL DENSITY WAS IN 2013 THAT I HAVE A RECORD OF APPEARED WITHIN LIMITS.  WE WILL REPEAT ANOTHER 1 AS WELL.    SHE DID HAVE A CT CORONARY WHICH IS SCANNED IN CARE EVERYWHERE IN JANUARY 2020 AND SHE HAD AN AGATSTON SCORE .    WE WILL ATTEMPT TO GET HER VACCINATION RECORDS FOR HER PRIOR PRIMARY CARE PHYSICIAN IN [place], DR. ANA M PENA    Future Appointments   Date Time Provider Department Center   6/28/2023 11:20 AM [facility], DEXA1 [facility] Sergio Hwdilcia Serna MD  [facility]

## 2023-06-28 ENCOUNTER — HOSPITAL ENCOUNTER (OUTPATIENT)
Dept: RADIOLOGY | Facility: CLINIC | Age: 73
Discharge: HOME | End: 2023-06-28
Attending: INTERNAL MEDICINE
Payer: MEDICARE

## 2023-06-28 DIAGNOSIS — Z78.0 ASYMPTOMATIC MENOPAUSE: ICD-10-CM

## 2023-06-28 PROCEDURE — 77080 DXA BONE DENSITY AXIAL: CPT | Mod: TC

## 2023-06-28 PROCEDURE — 77080 DXA BONE DENSITY AXIAL: CPT | Mod: 26,,, | Performed by: INTERNAL MEDICINE

## 2023-06-28 PROCEDURE — 77080 DXA BONE DENSITY AXIAL SKELETON 1 OR MORE SITES: ICD-10-PCS | Mod: 26,,, | Performed by: INTERNAL MEDICINE

## 2023-07-13 RX ORDER — SERTRALINE HYDROCHLORIDE 100 MG/1
TABLET, FILM COATED ORAL
COMMUNITY
Start: 2023-04-19 | End: 2023-07-13 | Stop reason: SDUPTHER

## 2023-07-13 NOTE — TELEPHONE ENCOUNTER
No care due was identified.  [facility] Embedded Care Due Messages. Reference number: [id number].   7/13/2023 3:55:51 PM CDT

## 2023-07-14 RX ORDER — LISINOPRIL AND HYDROCHLOROTHIAZIDE 20; 25 MG/1; MG/1
TABLET ORAL
Qty: 90 TABLET | Refills: 3 | Status: SHIPPED | OUTPATIENT
Start: 2023-07-14

## 2023-07-14 RX ORDER — ESOMEPRAZOLE MAGNESIUM 40 MG/1
40 CAPSULE, DELAYED RELEASE ORAL DAILY
Qty: 30 CAPSULE | Refills: 3 | Status: SHIPPED | OUTPATIENT
Start: 2023-07-14 | End: 2024-02-26 | Stop reason: SDUPTHER

## 2023-07-14 RX ORDER — SERTRALINE HYDROCHLORIDE 100 MG/1
100 TABLET, FILM COATED ORAL DAILY
Qty: 90 TABLET | Refills: 3 | Status: SHIPPED | OUTPATIENT
Start: 2023-07-14

## 2023-07-14 RX ORDER — METOPROLOL SUCCINATE 25 MG/1
25 TABLET, EXTENDED RELEASE ORAL DAILY
Qty: 90 TABLET | Refills: 3 | Status: SHIPPED | OUTPATIENT
Start: 2023-07-14

## 2023-07-25 RX ORDER — OXYBUTYNIN CHLORIDE 5 MG/1
5 TABLET, EXTENDED RELEASE ORAL DAILY
Qty: 30 TABLET | Refills: 3 | Status: SHIPPED | OUTPATIENT
Start: 2023-07-25 | End: 2023-10-16

## 2023-07-25 RX ORDER — AMLODIPINE BESYLATE 10 MG/1
10 TABLET ORAL DAILY
Qty: 90 TABLET | Refills: 3 | Status: SHIPPED | OUTPATIENT
Start: 2023-07-25

## 2023-07-25 RX ORDER — CLONIDINE HYDROCHLORIDE 0.1 MG/1
TABLET ORAL
Qty: 90 TABLET | Refills: 3 | Status: SHIPPED | OUTPATIENT
Start: 2023-07-25

## 2023-07-25 RX ORDER — PRAVASTATIN SODIUM 20 MG/1
20 TABLET ORAL DAILY
Qty: 90 TABLET | Refills: 3 | Status: SHIPPED | OUTPATIENT
Start: 2023-07-25

## 2023-07-25 NOTE — TELEPHONE ENCOUNTER
No care due was identified.  [facility] Embedded Care Due Messages. Reference number: [id number].   7/25/2023 11:47:44 AM CDT

## 2023-10-14 NOTE — TELEPHONE ENCOUNTER
No care due was identified.  [facility] Embedded Care Due Messages. Reference number: [id number].   10/14/2023 8:17:12 AM CDT

## 2023-10-15 NOTE — TELEPHONE ENCOUNTER
Refill Routing Note   Medication(s) are not appropriate for processing by [facility] for the following reason(s):      New or recently adjusted medication    [facility] action(s):  Defer Care Due:  None identified            Appointments  past 12m or future 3m with PCP    Date Provider   Last Visit   5/24/2023 Radha Waller MD   Next Visit   Visit date not found Radha Waller MD   ED visits in past 90 days: 0        Note composed:1:21 PM 10/15/2023

## 2023-10-16 RX ORDER — OXYBUTYNIN CHLORIDE 5 MG/1
5 TABLET, EXTENDED RELEASE ORAL
Qty: 90 TABLET | Refills: 1 | Status: SHIPPED | OUTPATIENT
Start: 2023-10-16

## 2023-12-21 RX ORDER — PHENAZOPYRIDINE HYDROCHLORIDE 100 MG/1
100 TABLET, FILM COATED ORAL 3 TIMES DAILY PRN
Qty: 21 TABLET | Refills: 0 | Status: SHIPPED | OUTPATIENT
Start: 2023-12-21 | End: 2023-12-31

## 2023-12-21 RX ORDER — CIPROFLOXACIN 500 MG/1
500 TABLET ORAL 2 TIMES DAILY
Qty: 10 TABLET | Refills: 0 | Status: SHIPPED | OUTPATIENT
Start: 2023-12-21

## 2024-01-03 ENCOUNTER — TELEPHONE (OUTPATIENT)
Dept: INTERNAL MEDICINE | Facility: CLINIC | Age: 74
End: 2024-01-03
Payer: MEDICARE

## 2024-01-03 RX ORDER — PROMETHAZINE HYDROCHLORIDE AND CODEINE PHOSPHATE 6.25; 1 MG/5ML; MG/5ML
5 SOLUTION ORAL NIGHTLY PRN
Qty: 118 ML | Refills: 0 | Status: SHIPPED | OUTPATIENT
Start: 2024-01-03

## 2024-01-04 ENCOUNTER — TELEPHONE (OUTPATIENT)
Dept: INTERNAL MEDICINE | Facility: CLINIC | Age: 74
End: 2024-01-04
Payer: MEDICARE

## 2024-02-06 DIAGNOSIS — Z12.11 COLON CANCER SCREENING: ICD-10-CM

## 2024-02-26 RX ORDER — ESOMEPRAZOLE MAGNESIUM 40 MG/1
40 CAPSULE, DELAYED RELEASE ORAL DAILY
Qty: 30 CAPSULE | Refills: 3 | Status: SHIPPED | OUTPATIENT
Start: 2024-02-26

## 2024-02-26 NOTE — TELEPHONE ENCOUNTER
Care Due:                  Date            Visit Type   Department     Provider  --------------------------------------------------------------------------------                                             [facility] -                                           INTERNAL  Last Visit: 05-      [facility]       Radha Waller  Next Visit: None Scheduled  None         None Found                                                            Last  Test          Frequency    Reason                     Performed    Due Date  --------------------------------------------------------------------------------    CMP.........  12 months..  lisinopriL-hydrochlorothi  05- 05-                             azide, pravastatin.......    Lipid Panel.  12 months..  pravastatin..............  05- 05-    Health [facility] Embedded Care Due Messages. Reference number: [id number].   2/26/2024 2:05:47 PM CST

## 2024-04-23 RX ORDER — PRAVASTATIN SODIUM 20 MG/1
20 TABLET ORAL
Qty: 90 TABLET | Refills: 0 | Status: SHIPPED | OUTPATIENT
Start: 2024-04-23

## 2024-04-23 RX ORDER — METOPROLOL SUCCINATE 25 MG/1
25 TABLET, EXTENDED RELEASE ORAL
Qty: 90 TABLET | Refills: 0 | Status: SHIPPED | OUTPATIENT
Start: 2024-04-23

## 2024-05-01 RX ORDER — CLONIDINE HYDROCHLORIDE 0.1 MG/1
TABLET ORAL
Qty: 90 TABLET | Refills: 3 | Status: SHIPPED | OUTPATIENT
Start: 2024-05-01

## 2024-05-01 NOTE — TELEPHONE ENCOUNTER
Care Due:                  Date            Visit Type   Department     Provider  --------------------------------------------------------------------------------                                             [facility] -                                           INTERNAL  Last Visit: 05-      [facility]       Radha Waller  Next Visit: None Scheduled  None         None Found                                                            Last  Test          Frequency    Reason                     Performed    Due Date  --------------------------------------------------------------------------------    CMP.........  12 months..  lisinopriL-hydrochlorothi  05- 05-                             azide, pravastatin.......    Lipid Panel.  12 months..  pravastatin..............  05- 05-    [facility] Embedded Care Due Messages. Reference number: [id number].   5/01/2024 8:10:05 AM CDT

## 2024-05-02 RX ORDER — CIPROFLOXACIN 500 MG/1
500 TABLET ORAL 2 TIMES DAILY
Qty: 10 TABLET | Refills: 0 | Status: SHIPPED | OUTPATIENT
Start: 2024-05-02

## 2024-05-07 RX ORDER — OXYBUTYNIN CHLORIDE 5 MG/1
5 TABLET, EXTENDED RELEASE ORAL
Qty: 90 TABLET | Refills: 0 | Status: SHIPPED | OUTPATIENT
Start: 2024-05-07

## 2024-05-07 NOTE — TELEPHONE ENCOUNTER
Laura Conner  is requesting a refill authorization.  Brief Assessment and Rationale for Refill:  Approve     Medication Therapy Plan:         Comments:     Note composed:5:43 AM 05/07/2024

## 2024-05-07 NOTE — TELEPHONE ENCOUNTER
No care due was identified.  [facility] Embedded Care Due Messages. Reference number: [id number].   5/07/2024 3:41:31 AM CDT

## 2024-05-28 DIAGNOSIS — Z00.00 ENCOUNTER FOR MEDICARE ANNUAL WELLNESS EXAM: ICD-10-CM

## 2024-07-07 NOTE — TELEPHONE ENCOUNTER
Care Due:                  Date            Visit Type   Department     Provider  --------------------------------------------------------------------------------                                             [facility]                                           INTERNAL  Last Visit: 05-      [facility]       Radha Waller  Next Visit: None Scheduled  None         None Found                                                            Last  Test          Frequency    Reason                     Performed    Due Date  --------------------------------------------------------------------------------    Office Visit  15 months..  amLODIPine, esomeprazole,   05- 08-                             lisinopriL-hydrochlorothi                             azide, metoprolol,                             oxybutynin, pravastatin,                             sertraline...............    CMP.........  12 months..  lisinopriL-hydrochlorothi  05- 05-                             azide, pravastatin.......    Lipid Panel.  12 months..  pravastatin..............  05- 05-    [facility] Embedded Care Due Messages. Reference number: [id number].   7/07/2024 3:40:59 AM CDT

## 2024-07-08 RX ORDER — ESOMEPRAZOLE MAGNESIUM 40 MG/1
CAPSULE, DELAYED RELEASE ORAL
Qty: 90 CAPSULE | Refills: 0 | Status: SHIPPED | OUTPATIENT
Start: 2024-07-08

## 2024-07-08 NOTE — TELEPHONE ENCOUNTER
Provider Staff:  Action required for this patient     Please see care gap opportunities below in Care Due Message.    Thanks!  [facility]     Appointments      Date Provider   Last Visit   5/24/2023 Radha Waller MD   Next Visit   Visit date not found Radha Waller MD      Refill Decision Note   Laura Conner  is requesting a refill authorization.  Brief Assessment and Rationale for Refill:  Approve     Medication Therapy Plan:         Comments:     Note composed:3:24 AM 07/08/2024

## 2024-07-26 NOTE — TELEPHONE ENCOUNTER
No care due was identified.  [facility] Embedded Care Due Messages. Reference number: [id number].   7/26/2024 3:40:33 AM CDT

## 2024-07-27 NOTE — TELEPHONE ENCOUNTER
Refill Routing Note   Medication(s) are not appropriate for processing by [facility] for the following reason(s):        Required vitals outdated    [facility] action(s):  Defer        Medication Therapy Plan: Due for OV      Appointments  past 12m or future 3m with PCP    Date Provider   Last Visit   5/24/2023 Radha Waller MD   Next Visit   7/31/2024 Radha Waller MD   ED visits in past 90 days: 0        Note composed:12:04 PM 07/27/2024

## 2024-07-29 RX ORDER — AMLODIPINE BESYLATE 10 MG/1
10 TABLET ORAL
Qty: 90 TABLET | Refills: 0 | Status: SHIPPED | OUTPATIENT
Start: 2024-07-29

## 2024-07-31 ENCOUNTER — OFFICE VISIT (OUTPATIENT)
Dept: INTERNAL MEDICINE | Facility: CLINIC | Age: 74
End: 2024-07-31
Payer: MEDICARE

## 2024-07-31 ENCOUNTER — CLINICAL SUPPORT (OUTPATIENT)
Dept: INTERNAL MEDICINE | Facility: CLINIC | Age: 74
End: 2024-07-31
Payer: MEDICARE

## 2024-07-31 VITALS
BODY MASS INDEX: 24.49 KG/M2 | WEIGHT: 147 LBS | DIASTOLIC BLOOD PRESSURE: 82 MMHG | HEIGHT: 65 IN | SYSTOLIC BLOOD PRESSURE: 132 MMHG

## 2024-07-31 DIAGNOSIS — E78.5 HYPERLIPIDEMIA LDL GOAL <100: ICD-10-CM

## 2024-07-31 DIAGNOSIS — I10 PRIMARY HYPERTENSION: Primary | Chronic | ICD-10-CM

## 2024-07-31 DIAGNOSIS — R79.9 ABNORMAL BLOOD CHEMISTRY: ICD-10-CM

## 2024-07-31 DIAGNOSIS — R94.4 DECREASED GFR: ICD-10-CM

## 2024-07-31 DIAGNOSIS — R73.09 IMPAIRED GLUCOSE TOLERANCE TEST: ICD-10-CM

## 2024-07-31 DIAGNOSIS — Z12.31 ENCOUNTER FOR SCREENING MAMMOGRAM FOR MALIGNANT NEOPLASM OF BREAST: ICD-10-CM

## 2024-07-31 DIAGNOSIS — R82.90 NONSPECIFIC FINDING ON EXAMINATION OF URINE: ICD-10-CM

## 2024-07-31 DIAGNOSIS — R53.0 NEOPLASTIC MALIGNANT RELATED FATIGUE: ICD-10-CM

## 2024-07-31 DIAGNOSIS — E78.5 HYPERLIPIDEMIA, UNSPECIFIED HYPERLIPIDEMIA TYPE: Primary | ICD-10-CM

## 2024-07-31 DIAGNOSIS — Z00.00 ANNUAL PHYSICAL EXAM: ICD-10-CM

## 2024-07-31 DIAGNOSIS — R53.83 FATIGUE, UNSPECIFIED TYPE: ICD-10-CM

## 2024-07-31 DIAGNOSIS — E55.9 VITAMIN D DEFICIENCY, UNSPECIFIED: ICD-10-CM

## 2024-07-31 LAB
25(OH)D3+25(OH)D2 SERPL-MCNC: 132 NG/ML (ref 30–96)
ALBUMIN SERPL BCP-MCNC: 4.5 G/DL (ref 3.5–5.2)
ALP SERPL-CCNC: 67 U/L (ref 55–135)
ALT SERPL W/O P-5'-P-CCNC: 32 U/L (ref 10–44)
ANION GAP SERPL CALC-SCNC: 11 MMOL/L (ref 8–16)
AST SERPL-CCNC: 30 U/L (ref 10–40)
BACTERIA #/AREA URNS AUTO: NORMAL /HPF
BILIRUB SERPL-MCNC: 0.9 MG/DL (ref 0.1–1)
BUN SERPL-MCNC: 18 MG/DL (ref 8–23)
CALCIUM SERPL-MCNC: 10 MG/DL (ref 8.7–10.5)
CHLORIDE SERPL-SCNC: 104 MMOL/L (ref 95–110)
CHOLEST SERPL-MCNC: 219 MG/DL (ref 120–199)
CHOLEST/HDLC SERPL: 3 {RATIO} (ref 2–5)
CO2 SERPL-SCNC: 25 MMOL/L (ref 23–29)
CREAT SERPL-MCNC: 1 MG/DL (ref 0.5–1.4)
ERYTHROCYTE [DISTWIDTH] IN BLOOD BY AUTOMATED COUNT: 13.1 % (ref 11.5–14.5)
EST. GFR  (NO RACE VARIABLE): 58.8 ML/MIN/1.73 M^2
ESTIMATED AVG GLUCOSE: 94 MG/DL (ref 68–131)
GLUCOSE SERPL-MCNC: 118 MG/DL (ref 70–110)
HBA1C MFR BLD: 4.9 % (ref 4–5.6)
HCT VFR BLD AUTO: 39.9 % (ref 37–48.5)
HDLC SERPL-MCNC: 74 MG/DL (ref 40–75)
HDLC SERPL: 33.8 % (ref 20–50)
HGB BLD-MCNC: 13.7 G/DL (ref 12–16)
LDLC SERPL CALC-MCNC: 116.8 MG/DL (ref 63–159)
MCH RBC QN AUTO: 30.9 PG (ref 27–31)
MCHC RBC AUTO-ENTMCNC: 34.3 G/DL (ref 32–36)
MCV RBC AUTO: 90 FL (ref 82–98)
MICROSCOPIC COMMENT: NORMAL
NONHDLC SERPL-MCNC: 145 MG/DL
PLATELET # BLD AUTO: 243 K/UL (ref 150–450)
PMV BLD AUTO: 9.9 FL (ref 9.2–12.9)
POTASSIUM SERPL-SCNC: 3.8 MMOL/L (ref 3.5–5.1)
PROT SERPL-MCNC: 7.3 G/DL (ref 6–8.4)
RBC # BLD AUTO: 4.44 M/UL (ref 4–5.4)
RBC #/AREA URNS AUTO: 3 /HPF (ref 0–4)
SODIUM SERPL-SCNC: 140 MMOL/L (ref 136–145)
SQUAMOUS #/AREA URNS AUTO: 1 /HPF
TRIGL SERPL-MCNC: 141 MG/DL (ref 30–150)
TSH SERPL DL<=0.005 MIU/L-ACNC: 2.75 UIU/ML (ref 0.4–4)
WBC # BLD AUTO: 6.49 K/UL (ref 3.9–12.7)
WBC #/AREA URNS AUTO: 1 /HPF (ref 0–5)

## 2024-07-31 PROCEDURE — 84443 ASSAY THYROID STIM HORMONE: CPT | Performed by: INTERNAL MEDICINE

## 2024-07-31 PROCEDURE — 99999 PR PBB SHADOW E&M-EST. PATIENT-LVL II: CPT | Mod: PBBFAC,,, | Performed by: INTERNAL MEDICINE

## 2024-07-31 PROCEDURE — 99999PBSHW PR PBB SHADOW TECHNICAL ONLY FILED TO HB: Mod: PBBFAC,,,

## 2024-07-31 PROCEDURE — G0009 ADMIN PNEUMOCOCCAL VACCINE: HCPCS | Mod: PBBFAC

## 2024-07-31 PROCEDURE — 80061 LIPID PANEL: CPT | Performed by: INTERNAL MEDICINE

## 2024-07-31 PROCEDURE — 90677 PCV20 VACCINE IM: CPT | Mod: PBBFAC

## 2024-07-31 PROCEDURE — 99212 OFFICE O/P EST SF 10 MIN: CPT | Mod: PBBFAC,25 | Performed by: INTERNAL MEDICINE

## 2024-07-31 PROCEDURE — 81001 URINALYSIS AUTO W/SCOPE: CPT | Performed by: INTERNAL MEDICINE

## 2024-07-31 PROCEDURE — 99214 OFFICE O/P EST MOD 30 MIN: CPT | Mod: S$PBB,,, | Performed by: INTERNAL MEDICINE

## 2024-07-31 PROCEDURE — 82306 VITAMIN D 25 HYDROXY: CPT | Performed by: INTERNAL MEDICINE

## 2024-07-31 PROCEDURE — 83036 HEMOGLOBIN GLYCOSYLATED A1C: CPT | Performed by: INTERNAL MEDICINE

## 2024-07-31 PROCEDURE — 85027 COMPLETE CBC AUTOMATED: CPT | Performed by: INTERNAL MEDICINE

## 2024-07-31 PROCEDURE — 80053 COMPREHEN METABOLIC PANEL: CPT | Performed by: INTERNAL MEDICINE

## 2024-07-31 RX ORDER — CIPROFLOXACIN 500 MG/1
500 TABLET ORAL 2 TIMES DAILY
Qty: 14 TABLET | Refills: 0 | Status: SHIPPED | OUTPATIENT
Start: 2024-07-31

## 2024-07-31 RX ORDER — LISINOPRIL AND HYDROCHLOROTHIAZIDE 20; 25 MG/1; MG/1
TABLET ORAL
Qty: 90 TABLET | Refills: 3 | Status: SHIPPED | OUTPATIENT
Start: 2024-07-31

## 2024-07-31 RX ORDER — TIRZEPATIDE 2.5 MG/.5ML
2.5 INJECTION, SOLUTION SUBCUTANEOUS
Qty: 4 PEN | Refills: 3 | Status: SHIPPED | OUTPATIENT
Start: 2024-07-31

## 2024-07-31 RX ORDER — LISINOPRIL AND HYDROCHLOROTHIAZIDE 20; 25 MG/1; MG/1
1 TABLET ORAL DAILY
Qty: 90 TABLET | Refills: 0 | OUTPATIENT
Start: 2024-07-31

## 2024-07-31 RX ADMIN — PNEUMOCOCCAL 20-VALENT CONJUGATE VACCINE 0.5 ML
2.2; 2.2; 2.2; 2.2; 2.2; 2.2; 2.2; 2.2; 2.2; 2.2; 2.2; 2.2; 2.2; 2.2; 2.2; 2.2; 4.4; 2.2; 2.2; 2.2 INJECTION, SUSPENSION INTRAMUSCULAR at 02:07

## 2024-07-31 NOTE — TELEPHONE ENCOUNTER
Refill Routing Note   Medication(s) are not appropriate for processing by [facility] for the following reason(s):        Required labs outdated: CMP    [facility] action(s):  Defer     Requires labs : Yes    Medication Therapy Plan:  Due for annual with PCP, labs      Appointments  past 12m or future 3m with PCP    Date Provider   Last Visit   5/24/2023 Radha Waller MD   Next Visit   7/31/2024 Radha Waller MD   ED visits in past 90 days: 0        Note composed:11:51 AM 07/31/2024

## 2024-08-02 NOTE — PROGRESS NOTES
Subjective:       Patient ID: Laura Conner is a 74 y.o. female who presents today for:    Chief Complaint:   Chief Complaint   Patient presents with    Annual Exam     Has been well, finally over lingering cough       HPI:  Very pleasant nonsmoking 74-year-old female who is very with only a past medical history significant for GERD, essential hypertension, mild dyslipidemia, urge incontinence, mild allergies gets 1 too many UTIs.  She has not had a UTI in quite some time.  She does take supplements to include cranberry, stool softener, Prevagen, vitamin ADK,supplement, pro/pre biotic, coenzyme Q. And vitamin-C.  She has no new complaints today like to drop couple of lb.  She is up-to-date on all of her health maintenance.  We will get a copy of her last colonoscopy which was years before COVID.  She did move here from [place].    Review of Systems   Constitutional:  Negative for chills, fever and weight loss.   HENT:  Negative for sore throat.    Eyes:  Negative for blurred vision and double vision.   Respiratory:  Negative for cough and shortness of breath.    Cardiovascular:  Negative for chest pain and palpitations.   Gastrointestinal:  Negative for constipation, diarrhea, nausea and vomiting.   Genitourinary:  Negative for dysuria and hematuria.   Musculoskeletal:  Negative for joint pain and myalgias.   Skin:  Negative for itching and rash.   Neurological:  Negative for sensory change, focal weakness and headaches.   Endo/Heme/Allergies:  Does not bruise/bleed easily.   Psychiatric/Behavioral:  Negative for depression and suicidal ideas.         Medications:  Outpatient Encounter Medications as of 7/31/2024   Medication Sig Dispense Refill    amLODIPine (NORVASC) 10 MG tablet TAKE 1 TABLET(10 MG) BY MOUTH EVERY DAY 90 tablet 0    ascorbic acid, vitamin C, (VITAMIN C) 500 MG tablet Take 500 mg by mouth once daily.      ciprofloxacin HCl (CIPRO) 500 MG tablet Take 1 tablet (500 mg total) by mouth 2  (two) times daily. 14 tablet 0    cloNIDine (CATAPRES) 0.1 MG tablet TAKE 1 TABLET BY MOUTH EVERY DAY AT BEDTIME 90 tablet 3    co-enzyme Q-10 50 mg capsule Take 100 mg by mouth once daily.      CRANBERRY CONC-ASCORBIC ACID ORAL Take by mouth.      docusate calcium (SURFAK) 240 mg capsule Take 240 mg by mouth 2 (two) times daily.      esomeprazole (NEXIUM) 40 MG capsule TAKE 1 CAPSULE(40 MG) BY MOUTH DAILY 90 capsule 0    lisinopriL-hydrochlorothiazide (PRINZIDE,ZESTORETIC) 20-25 mg Tab TAKE 1 TABLET ONCE A DAY ORALLY 90 DAYS 90 tablet 3    loratadine (CLARITIN) 10 mg tablet Take 10 mg by mouth once daily.      metoprolol succinate (TOPROL-XL) 25 MG 24 hr tablet TAKE 1 TABLET(25 MG) BY MOUTH EVERY DAY 90 tablet 0    oxybutynin (DITROPAN-XL) 5 MG TR24 TAKE 1 TABLET(5 MG) BY MOUTH EVERY DAY 90 tablet 0    pravastatin (PRAVACHOL) 20 MG tablet TAKE 1 TABLET(20 MG) BY MOUTH EVERY DAY 90 tablet 0    promethazine-codeine 6.25-10 mg/5 ml (PHENERGAN WITH CODEINE) 6.25-10 mg/5 mL syrup Take 5 mLs by mouth nightly as needed for Cough. 118 mL 0    psyllium 0.52 gram capsule Take 1.04 g by mouth 2 (two) times a day.      sertraline (ZOLOFT) 100 MG tablet TAKE 1 TABLET(100 MG) BY MOUTH EVERY DAY 90 tablet 0    tirzepatide (MOUNJARO) 2.5 mg/0.5 mL PnIj Inject 2.5 mg into the skin every 7 days. 4 Pen 3    UNABLE TO FIND Pre-Pro biotic      UNABLE TO FIND ADK      UNABLE TO FIND prevagen      [DISCONTINUED] amLODIPine (NORVASC) 10 MG tablet Take 1 tablet (10 mg total) by mouth once daily. 90 tablet 3    [DISCONTINUED] ciprofloxacin HCl (CIPRO) 500 MG tablet Take 1 tablet (500 mg total) by mouth 2 (two) times daily. 10 tablet 0    [DISCONTINUED] CONTRAVE 8-90 mg TbSR Take by mouth.      [DISCONTINUED] lisinopriL-hydrochlorothiazide (PRINZIDE,ZESTORETIC) 20-25 mg Tab TAKE 1 TABLET ONCE A DAY ORALLY 90 DAYS 90 tablet 3    [] pneumoc 20-angelica conj-dip cr(PF) (PREVNAR-20 (PF)) injection Syrg 0.5 mL        No facility-administered  "encounter medications on file as of 7/31/2024.       Allergies:  Review of patient's allergies indicates:  No Known Allergies    Health Maintenance:  Immunization History   Administered Date(s) Administered    COVID-19, MRNA, LN-S, PF (Pfizer) (Purple Cap) 01/08/2021, 01/29/2021, 09/29/2021    COVID-19, mRNA, LNP-S, bivalent booster, PF (PFIZER OMICRON) 09/28/2022    Influenza (FLUAD) - Quadrivalent - Adjuvanted - PF *Preferred* (65+) 09/28/2022    Influenza - High Dose - PF (65 years and older) 10/27/2014, 11/09/2016, 10/12/2017, 11/08/2018    Influenza - Quadrivalent - PF *Preferred* (6 months and older) 10/07/2013, 09/18/2019    Pneumococcal Conjugate - 20 Valent 07/31/2024    Pneumococcal Polysaccharide - 23 Valent 09/20/2019    Tdap 11/24/2018    Zoster Recombinant 12/01/2021, 04/28/2022      Health Maintenance   Topic Date Due    Hepatitis C Screening  Never done    Colorectal Cancer Screening  Never done    TETANUS VACCINE  11/24/2028    Lipid Panel  07/31/2029    Shingles Vaccine  Completed    DEXA Scan  Discontinued          Objective:      Vital Signs  BP: 132/82  Pain Score: 0-No pain  Height and Weight  Height: 5' 5" (165.1 cm)  Weight: 66.7 kg (147 lb)  BSA (Calculated - sq m): 1.75 sq meters  BMI (Calculated): 24.5  Weight in (lb) to have BMI = 25: 149.9]    Physical Exam  HENT:      Head: Normocephalic and atraumatic.   Eyes:      General: No scleral icterus.  Cardiovascular:      Rate and Rhythm: Normal rate and regular rhythm.      Heart sounds: No murmur heard.  Pulmonary:      Effort: Pulmonary effort is normal.      Breath sounds: Normal breath sounds. No rales.   Abdominal:      General: Bowel sounds are normal.      Palpations: Abdomen is soft.      Tenderness: There is no abdominal tenderness.   Musculoskeletal:         General: No tenderness.      Cervical back: Neck supple.   Neurological:      Mental Status: She is alert and oriented to person, place, and time.   Psychiatric:         Mood " and Affect: Mood normal.         Behavior: Behavior normal.         Thought Content: Thought content normal.         Judgment: Judgment normal.        Lab Results   Component Value Date    WBC 6.49 07/31/2024    HGB 13.7 07/31/2024    HCT 39.9 07/31/2024     07/31/2024    CHOL 219 (H) 07/31/2024    TRIG 141 07/31/2024    HDL 74 07/31/2024    ALT 32 07/31/2024    AST 30 07/31/2024     07/31/2024    K 3.8 07/31/2024     07/31/2024    CREATININE 1.0 07/31/2024    BUN 18 07/31/2024    CO2 25 07/31/2024    TSH 2.754 07/31/2024    HGBA1C 4.9 07/31/2024     Assessment/plan:     Laura Conner is a 74 y.o.female with:    Annual physical exam     Health Maintenance   Topic Date Due    Hepatitis C Screening  Never done    Colorectal Cancer Screening  Never done-attempting to get a copy of her last colonoscopy by Dr Pratt in [place]    TETANUS VACCINE  11/24/2028    Lipid Panel  07/31/2029    Shingles Vaccine  Completed    DEXA Scan  Discontinued     Health Maintenance Reviewed    Encounter for screening mammogram for malignant neoplasm of breast  -     Mammo Digital Screening Bilat; Future; Expected date: 07/31/2024    Hyperlipidemia LDL goal <100   Continue with pravastatin 20 mg daily.    Primary hypertension-well-controlled   Continue with lisinopril 20 mg/HCTZ 25 mg and amlodipine 10 mg daily as well as clonidine 0.1 mg nightly and metoprolol 25 mg XL once daily.    Decreased GFR at 59   In view of being on HCTZ and blood pressure well-controlled will recheck this hydrated.  May take HCTZ at a blood pressure medication or decrease dose of 12.525 mg of HCTZ.    Other orders  -     pneumoc 20-angelica conj-dip cr(PF) (PREVNAR-20 (PF)) injection Syrg 0.5 mL    Abnormal weight gain  -     tirzepatide (MOUNJARO) 2.5 mg/0.5 mL PnIj; Inject 2.5 mg into the skin every 7 days.  Dispense: 4 Pen; Refill: 3    Elevated vitamin-D level   Decrease daily dose of vitamin-D by at least 2000 IU daily 4 or 5000 IU  weekly.  We can recheck in 6 months but since liver enzymes are normal this has not necessary.    Future Appointments   Date Time Provider Department Center   8/7/2024 12:00 PM [facility] Virk   8/7/2024 12:30 PM NURSE, [facility] INT Sergio Serna MD  [facility]

## 2024-08-07 ENCOUNTER — CLINICAL SUPPORT (OUTPATIENT)
Dept: INTERNAL MEDICINE | Facility: CLINIC | Age: 74
End: 2024-08-07
Payer: MEDICARE

## 2024-08-07 ENCOUNTER — HOSPITAL ENCOUNTER (OUTPATIENT)
Dept: RADIOLOGY | Facility: HOSPITAL | Age: 74
Discharge: HOME | End: 2024-08-07
Attending: INTERNAL MEDICINE
Payer: MEDICARE

## 2024-08-07 VITALS — BODY MASS INDEX: 24.16 KG/M2 | HEIGHT: 65 IN | WEIGHT: 145 LBS

## 2024-08-07 DIAGNOSIS — Z12.31 ENCOUNTER FOR SCREENING MAMMOGRAM FOR MALIGNANT NEOPLASM OF BREAST: ICD-10-CM

## 2024-08-07 DIAGNOSIS — I10 PRIMARY HYPERTENSION: Chronic | ICD-10-CM

## 2024-08-07 LAB
ANION GAP SERPL CALC-SCNC: 15 MMOL/L (ref 8–16)
BUN SERPL-MCNC: 22 MG/DL (ref 8–23)
CALCIUM SERPL-MCNC: 10.9 MG/DL (ref 8.7–10.5)
CHLORIDE SERPL-SCNC: 103 MMOL/L (ref 95–110)
CO2 SERPL-SCNC: 20 MMOL/L (ref 23–29)
CREAT SERPL-MCNC: 0.9 MG/DL (ref 0.5–1.4)
EST. GFR  (NO RACE VARIABLE): >60 ML/MIN/1.73 M^2
GLUCOSE SERPL-MCNC: 92 MG/DL (ref 70–110)
POTASSIUM SERPL-SCNC: 4 MMOL/L (ref 3.5–5.1)
SODIUM SERPL-SCNC: 138 MMOL/L (ref 136–145)

## 2024-08-07 PROCEDURE — 80048 BASIC METABOLIC PNL TOTAL CA: CPT | Performed by: INTERNAL MEDICINE

## 2024-08-07 PROCEDURE — 77067 SCR MAMMO BI INCL CAD: CPT | Mod: TC

## 2024-08-07 RX ORDER — SERTRALINE HYDROCHLORIDE 100 MG/1
100 TABLET, FILM COATED ORAL
Qty: 90 TABLET | Refills: 3 | Status: SHIPPED | OUTPATIENT
Start: 2024-08-07

## 2024-08-07 RX ORDER — METOPROLOL SUCCINATE 25 MG/1
25 TABLET, EXTENDED RELEASE ORAL
Qty: 90 TABLET | Refills: 0 | Status: SHIPPED | OUTPATIENT
Start: 2024-08-07

## 2024-08-11 RX ORDER — OXYBUTYNIN CHLORIDE 5 MG/1
5 TABLET, EXTENDED RELEASE ORAL
Qty: 90 TABLET | Refills: 3 | Status: SHIPPED | OUTPATIENT
Start: 2024-08-11

## 2024-08-11 NOTE — TELEPHONE ENCOUNTER
No care due was identified.  Health [facility] Embedded Care Due Messages. Reference number: [id number].   8/11/2024 3:40:02 AM CDT

## 2024-08-12 NOTE — TELEPHONE ENCOUNTER
Refill Decision Note   Laura Dalila  is requesting a refill authorization.  Brief Assessment and Rationale for Refill:  Approve     Medication Therapy Plan:         Comments:     Note composed:10:37 PM 08/11/2024

## 2024-09-06 ENCOUNTER — DOCUMENTATION ONLY (OUTPATIENT)
Dept: INTERNAL MEDICINE | Facility: CLINIC | Age: 74
End: 2024-09-06
Payer: MEDICARE

## 2024-09-06 LAB — CRC RECOMMENDATION EXT: NORMAL

## 2024-09-10 ENCOUNTER — DOCUMENTATION ONLY (OUTPATIENT)
Dept: INTERNAL MEDICINE | Facility: CLINIC | Age: 74
End: 2024-09-10
Payer: MEDICARE

## 2024-09-10 LAB — CRC RECOMMENDATION EXT: NORMAL

## 2024-10-04 RX ORDER — ESOMEPRAZOLE MAGNESIUM 40 MG/1
CAPSULE, DELAYED RELEASE ORAL
Qty: 90 CAPSULE | Refills: 3 | Status: SHIPPED | OUTPATIENT
Start: 2024-10-04

## 2024-10-04 NOTE — TELEPHONE ENCOUNTER
No care due was identified.  [facility] Embedded Care Due Messages. Reference number: [id number].   10/04/2024 9:19:40 AM CDT   show

## 2024-10-04 NOTE — TELEPHONE ENCOUNTER
Refill Decision Note   Laura Conner  is requesting a refill authorization.  Brief Assessment and Rationale for Refill:  Approve     Medication Therapy Plan:       Medication Reconciliation Completed: No   Comments:     No Care Gaps recommended.     Note composed:6:05 PM 10/04/2024

## 2024-10-27 RX ORDER — AMLODIPINE BESYLATE 10 MG/1
10 TABLET ORAL
Qty: 90 TABLET | Refills: 3 | Status: SHIPPED | OUTPATIENT
Start: 2024-10-27

## 2024-11-03 NOTE — TELEPHONE ENCOUNTER
Care Due:                  Date            Visit Type   Department     Provider  --------------------------------------------------------------------------------                                             [facility]                              PHYSICAL -   HEALTH -                              ESTABLISHED   INTERNAL  Last Visit: 07-      PATIENT      MEDICINE       Radha Waller  Next Visit: None Scheduled  None         None Found                                                            Last  Test          Frequency    Reason                     Performed    Due Date  --------------------------------------------------------------------------------    HBA1C.......  6 months...  tirzepatide..............  07- 01-    Health [facility] Embedded Care Due Messages. Reference number: [id number].   11/03/2024 9:00:55 AM CST

## 2024-11-04 RX ORDER — PRAVASTATIN SODIUM 20 MG/1
20 TABLET ORAL
Qty: 90 TABLET | Refills: 2 | Status: SHIPPED | OUTPATIENT
Start: 2024-11-04

## 2024-11-04 NOTE — TELEPHONE ENCOUNTER
Provider Staff:  Action required for this patient    Requires labs      Please see care gap opportunities below in Care Due Message.    Thanks!  [facility]     Appointments      Date Provider   Last Visit   7/31/2024 Radha Waller MD   Next Visit   Visit date not found Radha Waller MD     Refill Decision Note   Laura Conner  is requesting a refill authorization.  Brief Assessment and Rationale for Refill:  Approve     Medication Therapy Plan:         Comments:     Note composed:7:01 AM 11/04/2024

## 2024-11-29 RX ORDER — TIRZEPATIDE 2.5 MG/.5ML
2.5 INJECTION, SOLUTION SUBCUTANEOUS
Qty: 4 PEN | Refills: 3 | Status: SHIPPED | OUTPATIENT
Start: 2024-11-29

## 2024-11-29 NOTE — TELEPHONE ENCOUNTER
No care due was identified.  [facility] Embedded Care Due Messages. Reference number: [id number].   11/29/2024 2:21:49 PM CST

## 2025-01-30 DIAGNOSIS — Z00.00 ENCOUNTER FOR MEDICARE ANNUAL WELLNESS EXAM: ICD-10-CM

## 2025-02-01 NOTE — TELEPHONE ENCOUNTER
Care Due:                  Date            Visit Type   Department     Provider  --------------------------------------------------------------------------------                                             [facility]                              PHYSICAL -   HEALTH -                              ESTABLISHED   INTERNAL  Last Visit: 07-      PATIENT      MEDICINE       Radha Waller  Next Visit: None Scheduled  None         None Found                                                            Last  Test          Frequency    Reason                     Performed    Due Date  --------------------------------------------------------------------------------    HBA1C.......  6 months...  tirzepatide..............  07- 01-    [facility] Embedded Care Due Messages. Reference number: [id number].   2/01/2025 3:39:33 AM CST

## 2025-02-02 RX ORDER — METOPROLOL SUCCINATE 25 MG/1
25 TABLET, EXTENDED RELEASE ORAL
Qty: 90 TABLET | Refills: 0 | Status: SHIPPED | OUTPATIENT
Start: 2025-02-02

## 2025-02-02 NOTE — TELEPHONE ENCOUNTER
Refill Routing Note   Medication(s) are not appropriate for processing by [facility] for the following reason(s):        Required vitals outdated    [facility] action(s):  Defer     Requires labs : Yes             Appointments  past 12m or future 3m with PCP    Date Provider   Last Visit   7/31/2024 Radha Waller MD   Next Visit   Visit date not found Radha Waller MD   ED visits in past 90 days: 0        Note composed:10:32 PM 02/01/2025

## 2025-02-07 RX ORDER — METOPROLOL SUCCINATE 25 MG/1
25 TABLET, EXTENDED RELEASE ORAL
Qty: 90 TABLET | Refills: 0 | OUTPATIENT
Start: 2025-02-07

## 2025-02-07 NOTE — TELEPHONE ENCOUNTER
No care due was identified.  [facility] Embedded Care Due Messages. Reference number: [id number].   2/07/2025 8:10:02 AM CST

## 2025-03-11 NOTE — PROGRESS NOTES
Subjective:     Patient ID: aLura Conner is a 68 y.o. female.    Chief Complaint: Cough (started on monday ); Headache; and Sinus Problem    HPI  Review of Systems    Objective:      Physical Exam   Constitutional: She appears well-developed and well-nourished.   HENT:   Head: Normocephalic and atraumatic.   Right Ear: External ear normal.   Left Ear: External ear normal.   Nares with clear mucous , mild erythema of posterior pharynx, tms clear. Bilateral maxillary sinus tenderness   Eyes: EOM are normal. Pupils are equal, round, and reactive to light.   Conj mildly injected   Neck: Normal range of motion. Neck supple.   Cardiovascular: Normal rate, regular rhythm and normal heart sounds.   Pulmonary/Chest: Effort normal and breath sounds normal.   Nursing note and vitals reviewed.      Assessment:     Laura was seen today for cough, headache and sinus problem.    Diagnoses and all orders for this visit:    Acute non-recurrent maxillary sinusitis-most likely viral. Wear a mask when around her grandbaby./ frequent hand washing    Need for pertussis vaccination  -     (In Office Administered) Tdap Vaccine    Elevated blood pressure reading  Recommend she follow with her PCP for this       No

## 2025-04-07 ENCOUNTER — PATIENT MESSAGE (OUTPATIENT)
Dept: INTERNAL MEDICINE | Facility: CLINIC | Age: 75
End: 2025-04-07
Payer: MEDICARE

## 2025-05-04 DIAGNOSIS — I10 PRIMARY HYPERTENSION: Chronic | ICD-10-CM

## 2025-05-04 RX ORDER — LISINOPRIL AND HYDROCHLOROTHIAZIDE 20; 25 MG/1; MG/1
1 TABLET ORAL
Qty: 90 TABLET | Refills: 0 | Status: SHIPPED | OUTPATIENT
Start: 2025-05-04

## 2025-05-04 NOTE — TELEPHONE ENCOUNTER
Care Due:                  Date            Visit Type   Department     Provider  --------------------------------------------------------------------------------                                PHYSICAL -   [facility]                              [facility] INTERNAL  Last Visit: 07-      PATIENT      MEDICINE       Radha  Christin  Next Visit: None Scheduled  None         None Found                                                            Last  Test          Frequency    Reason                     Performed    Due Date  --------------------------------------------------------------------------------    CMP.........  12 months..  pravastatin..............  07- 07-    HBA1C.......  6 months...  tirzepatide..............  07- 01-    Lipid Panel.  12 months..  pravastatin..............  07- 07-    [facility] [employer] Embedded Care Due Messages. Reference number: [id number].   5/04/2025 8:10:20 AM CDT

## 2025-05-05 NOTE — TELEPHONE ENCOUNTER
Provider Staff:  Action required for this patient     Please see care gap opportunities below in Care Due Message.    Thanks!  [facility]     Appointments      Date Provider   Last Visit   7/31/2024 Radha Waller MD   Next Visit   Visit date not found Radha Waller MD      Refill Decision Note   Laura Conner  is requesting a refill authorization.  Brief Assessment and Rationale for Refill:  Approve     Medication Therapy Plan:         Comments:     Note composed:11:10 PM 05/04/2025

## 2025-05-14 RX ORDER — OXYBUTYNIN CHLORIDE 5 MG/1
5 TABLET, EXTENDED RELEASE ORAL
Qty: 90 TABLET | Refills: 0 | Status: SHIPPED | OUTPATIENT
Start: 2025-05-14

## 2025-05-14 RX ORDER — PRAVASTATIN SODIUM 20 MG/1
20 TABLET ORAL
Qty: 90 TABLET | Refills: 0 | Status: SHIPPED | OUTPATIENT
Start: 2025-05-14

## 2025-05-14 NOTE — TELEPHONE ENCOUNTER
Refill Decision Note   Laura Dalila  is requesting a refill authorization.  Brief Assessment and Rationale for Refill:  Approve     Medication Therapy Plan:         Comments:     Note composed:11:34 AM 05/14/2025

## 2025-05-14 NOTE — TELEPHONE ENCOUNTER
No care due was identified.  [facility] Embedded Care Due Messages. Reference number: [id number].   5/14/2025 8:10:01 AM CDT

## 2025-05-16 RX ORDER — METOPROLOL SUCCINATE 25 MG/1
25 TABLET, EXTENDED RELEASE ORAL
Qty: 90 TABLET | Refills: 0 | Status: SHIPPED | OUTPATIENT
Start: 2025-05-16

## 2025-05-16 NOTE — TELEPHONE ENCOUNTER
Refill Routing Note   Medication(s) are not appropriate for processing by [facility] for the following reason(s):        Required vitals outdated    [facility] action(s):  Defer               Appointments  past 12m or future 3m with PCP    Date Provider   Last Visit   7/31/2024 Radha Waller MD   Next Visit   Visit date not found Radha Waller MD   ED visits in past 90 days: 0        Note composed:3:03 PM 05/16/2025

## 2025-05-16 NOTE — TELEPHONE ENCOUNTER
No care due was identified.  [facility] Embedded Care Due Messages. Reference number: [id number].   5/16/2025 1:29:32 PM CDT

## 2025-06-05 RX ORDER — CIPROFLOXACIN 500 MG/1
500 TABLET, FILM COATED ORAL 2 TIMES DAILY
Qty: 14 TABLET | Refills: 0 | Status: SHIPPED | OUTPATIENT
Start: 2025-06-05

## 2025-06-09 RX ORDER — CLONIDINE HYDROCHLORIDE 0.1 MG/1
0.1 TABLET ORAL NIGHTLY
Qty: 30 TABLET | Refills: 0 | Status: SHIPPED | OUTPATIENT
Start: 2025-06-09

## 2025-06-09 NOTE — TELEPHONE ENCOUNTER
No care due was identified.  [facility] Embedded Care Due Messages. Reference number: [id number].   6/09/2025 3:39:59 AM CDT

## 2025-08-05 RX ORDER — AMLODIPINE BESYLATE 10 MG/1
10 TABLET ORAL
Qty: 90 TABLET | Refills: 0 | Status: SHIPPED | OUTPATIENT
Start: 2025-08-05

## 2025-08-05 NOTE — TELEPHONE ENCOUNTER
Refill Routing Note   Medication(s) are not appropriate for processing by [facility] for the following reason(s):        Required vitals outdated    [facility] action(s):  Defer   Requires appointment : Yes               Appointments  past 12m or future 3m with PCP    Date Provider   Last Visit   7/31/2024 Radha Waller MD   Next Visit   Visit date not found Radha Waller MD   ED visits in past 90 days: 0        Note composed:9:19 AM 08/05/2025

## 2025-08-05 NOTE — TELEPHONE ENCOUNTER
Care Due:                  Date            Visit Type   Department     Provider  --------------------------------------------------------------------------------                                PHYSICAL -   JO[facility] JAIN                              [facility] INTERNAL  Last Visit: 07-      PATIENT      MEDICINE       Radha Waller  Next Visit: None Scheduled  None         None Found                                                            Last  Test          Frequency    Reason                     Performed    Due Date  --------------------------------------------------------------------------------    Office Visit  15 months..  amLODIPine, esomeprazole,   07-   10-                             lisinopriL-hydrochlorothi                             azide, metoprolol,                             oxybutynin, pravastatin,                             sertraline, tirzepatide..    [employer] Embedded Care Due Messages. Reference number: [id number].   8/05/2025 8:09:57 AM AMANDAT

## 2025-08-08 RX ORDER — SERTRALINE HYDROCHLORIDE 100 MG/1
100 TABLET, FILM COATED ORAL
Qty: 90 TABLET | Refills: 0 | Status: SHIPPED | OUTPATIENT
Start: 2025-08-08

## 2025-08-08 NOTE — TELEPHONE ENCOUNTER
Refill Decision Note   Laura Dalila  is requesting a refill authorization.  Brief Assessment and Rationale for Refill:  Approve     Medication Therapy Plan:         Comments:     Note composed:9:16 AM 08/08/2025

## 2025-08-15 RX ORDER — METOPROLOL SUCCINATE 25 MG/1
25 TABLET, EXTENDED RELEASE ORAL
Qty: 90 TABLET | Refills: 0 | Status: SHIPPED | OUTPATIENT
Start: 2025-08-15

## 2025-08-20 RX ORDER — METOPROLOL SUCCINATE 25 MG/1
25 TABLET, EXTENDED RELEASE ORAL
Qty: 90 TABLET | Refills: 0 | OUTPATIENT
Start: 2025-08-20

## 2025-08-20 RX ORDER — SERTRALINE HYDROCHLORIDE 100 MG/1
100 TABLET, FILM COATED ORAL
Qty: 90 TABLET | Refills: 0 | Status: SHIPPED | OUTPATIENT
Start: 2025-08-20

## 2025-08-22 DIAGNOSIS — I10 PRIMARY HYPERTENSION: Chronic | ICD-10-CM

## 2025-08-22 RX ORDER — LISINOPRIL AND HYDROCHLOROTHIAZIDE 20; 25 MG/1; MG/1
1 TABLET ORAL DAILY
Qty: 90 TABLET | Refills: 3 | Status: SHIPPED | OUTPATIENT
Start: 2025-08-22

## 2025-08-22 RX ORDER — PRAVASTATIN SODIUM 20 MG/1
20 TABLET ORAL NIGHTLY
Qty: 90 TABLET | Refills: 3 | Status: SHIPPED | OUTPATIENT
Start: 2025-08-22

## 2025-08-22 RX ORDER — CLONIDINE HYDROCHLORIDE 0.1 MG/1
0.1 TABLET ORAL NIGHTLY
Qty: 90 TABLET | Refills: 3 | Status: SHIPPED | OUTPATIENT
Start: 2025-08-22